# Patient Record
Sex: FEMALE | Race: WHITE | NOT HISPANIC OR LATINO | Employment: OTHER | URBAN - METROPOLITAN AREA
[De-identification: names, ages, dates, MRNs, and addresses within clinical notes are randomized per-mention and may not be internally consistent; named-entity substitution may affect disease eponyms.]

---

## 2019-02-01 NOTE — PATIENT DISCUSSION
CORNEAL ULCER, OS:  [facility]CT [facility]. PRESCRIBE VIGAMOX 1 DROP Q15 MIN FOR ONE HOUR FOLLOWED BY 1 DROP EVERY 1 HR AROUND THE CLOCK. RX EMYCIN ELAN QHS. STAY OUT OF CONTACTS. THROW AWAY OLD CL AND CASE. PT EDUCATION. RETURN FOR FOLLOW-UP AS SCHEDULED.

## 2019-02-01 NOTE — PATIENT DISCUSSION
CORNEAL ULCER:  I have talked with the patient about the significance of a corneal ulcer and the concern about long-term vision loss if not aggressively treated.

## 2019-02-01 NOTE — PATIENT DISCUSSION
Discussed with patient there may some discomfort including pain, redness, light sensitivity, tearing and blurry vision. The patient was instructed to stay out of contact lenses until directed by doctor after re-evaluation. Gave RX for Ocuflox 1 drop 4 times a day. Artificial tears may be used in between doses of Ocuflox to relieve discomfort.

## 2019-02-01 NOTE — PATIENT DISCUSSION
PT LIVES IN [place]. WILL BE IN [facility] UNTIL EARLY NEXT WEEK. SCHEDULE FOLLOW UP WITH [facility] SPECIAL[facility], DR. CHEN. PT HAS AN APPT AT 9:00 AM.   PT INSTRUCTED TO CALL OVER THE WEEKEND IF ANY WORSENING OF SYMPTOMS. PT UNDERSTANDS. DISCUSSED IMPORTANCE OF COMPLIANCE WITH SCHEDULE OF GTTS AND ELAN. DISCUSSED IMPORTANCE OF NO CL WEAR.

## 2019-08-30 NOTE — PATIENT DISCUSSION
This visual field clearly demonstrated a minimum of 32% loss of upper field of vision OU, with upper lid skin in repose and elevated by taping of the lid to demonstrate potential correction. This field shows that taping the lids significantly improved this patient's superior field of vision by approximately 31%, OU.

## 2020-01-07 ENCOUNTER — NEW REFERRAL (OUTPATIENT)
Dept: URBAN - METROPOLITAN AREA CLINIC 33 | Facility: CLINIC | Age: 85
End: 2020-01-07

## 2020-01-07 VITALS
HEIGHT: 58 IN | DIASTOLIC BLOOD PRESSURE: 80 MMHG | SYSTOLIC BLOOD PRESSURE: 130 MMHG | BODY MASS INDEX: 29.39 KG/M2 | WEIGHT: 140 LBS | HEART RATE: 78 BPM

## 2020-01-07 DIAGNOSIS — H35.61: ICD-10-CM

## 2020-01-07 DIAGNOSIS — H35.3231: ICD-10-CM

## 2020-01-07 DIAGNOSIS — H43.813: ICD-10-CM

## 2020-01-07 PROCEDURE — 92004 COMPRE OPH EXAM NEW PT 1/>: CPT

## 2020-01-07 PROCEDURE — 92250 FUNDUS PHOTOGRAPHY W/I&R: CPT

## 2020-01-07 PROCEDURE — 92134 CPTRZ OPH DX IMG PST SGM RTA: CPT

## 2020-01-07 ASSESSMENT — TONOMETRY
OS_IOP_MMHG: 14
OD_IOP_MMHG: 13

## 2020-01-07 ASSESSMENT — VISUAL ACUITY
OD_CC: 20/50-2
OD_SC: 20/50-2
OS_SC: 20/60-1
OS_CC: 20/50-1

## 2020-01-08 ENCOUNTER — CLINIC PROCEDURE ONLY (OUTPATIENT)
Dept: URBAN - METROPOLITAN AREA CLINIC 33 | Facility: CLINIC | Age: 85
End: 2020-01-08

## 2020-01-08 DIAGNOSIS — H35.3231: ICD-10-CM

## 2020-01-08 PROCEDURE — J0178* EYLEA

## 2020-01-08 PROCEDURE — 67028 INJECTION EYE DRUG: CPT

## 2020-01-08 ASSESSMENT — TONOMETRY
OS_IOP_MMHG: 15
OD_IOP_MMHG: 14

## 2020-01-08 ASSESSMENT — VISUAL ACUITY
OD_CC: 20/50+1
OS_CC: 20/50+2

## 2020-02-18 ENCOUNTER — CLINICAL PROCEDURE AND DIAGNOSTIC TESTING ONLY (OUTPATIENT)
Dept: URBAN - METROPOLITAN AREA CLINIC 33 | Facility: CLINIC | Age: 85
End: 2020-02-18

## 2020-02-18 DIAGNOSIS — H35.3231: ICD-10-CM

## 2020-02-18 PROCEDURE — 67028 INJECTION EYE DRUG: CPT

## 2020-02-18 PROCEDURE — 92134 CPTRZ OPH DX IMG PST SGM RTA: CPT

## 2020-02-18 PROCEDURE — 92250 FUNDUS PHOTOGRAPHY W/I&R: CPT

## 2020-02-18 PROCEDURE — J0178* EYLEA

## 2020-02-18 ASSESSMENT — TONOMETRY
OS_IOP_MMHG: 17
OD_IOP_MMHG: 16

## 2020-02-18 ASSESSMENT — VISUAL ACUITY
OD_CC: 20/25-2
OS_CC: 20/30-2

## 2020-03-31 ENCOUNTER — FOLLOW UP AND POST INJECTION EVALUATION (OUTPATIENT)
Dept: URBAN - METROPOLITAN AREA CLINIC 33 | Facility: CLINIC | Age: 85
End: 2020-03-31

## 2020-03-31 DIAGNOSIS — H35.3231: ICD-10-CM

## 2020-03-31 DIAGNOSIS — H35.61: ICD-10-CM

## 2020-03-31 DIAGNOSIS — H43.813: ICD-10-CM

## 2020-03-31 PROCEDURE — 92134 CPTRZ OPH DX IMG PST SGM RTA: CPT

## 2020-03-31 PROCEDURE — 67028 INJECTION EYE DRUG: CPT

## 2020-03-31 PROCEDURE — J0178* EYLEA

## 2020-03-31 PROCEDURE — 92012 INTRM OPH EXAM EST PATIENT: CPT

## 2020-03-31 PROCEDURE — 92250 FUNDUS PHOTOGRAPHY W/I&R: CPT

## 2020-03-31 ASSESSMENT — TONOMETRY
OS_IOP_MMHG: 13
OD_IOP_MMHG: 15

## 2020-03-31 ASSESSMENT — VISUAL ACUITY
OD_CC: 20/25-2
OS_CC: 20/30+1

## 2020-05-12 ENCOUNTER — CLINICAL PROCEDURE AND DIAGNOSTIC TESTING ONLY (OUTPATIENT)
Dept: URBAN - METROPOLITAN AREA CLINIC 33 | Facility: CLINIC | Age: 85
End: 2020-05-12

## 2020-05-12 DIAGNOSIS — H35.3231: ICD-10-CM

## 2020-05-12 PROCEDURE — 92250 FUNDUS PHOTOGRAPHY W/I&R: CPT

## 2020-05-12 PROCEDURE — 92134 CPTRZ OPH DX IMG PST SGM RTA: CPT

## 2020-05-12 PROCEDURE — J0178* EYLEA

## 2020-05-12 PROCEDURE — 67028 INJECTION EYE DRUG: CPT

## 2020-05-12 ASSESSMENT — TONOMETRY
OD_IOP_MMHG: 12
OS_IOP_MMHG: 13

## 2020-05-12 ASSESSMENT — VISUAL ACUITY
OD_CC: 20/30+1
OS_CC: 20/30+1

## 2020-06-23 ENCOUNTER — CLINICAL PROCEDURE AND DIAGNOSTIC TESTING ONLY (OUTPATIENT)
Dept: URBAN - METROPOLITAN AREA CLINIC 33 | Facility: CLINIC | Age: 85
End: 2020-06-23

## 2020-06-23 DIAGNOSIS — H35.3231: ICD-10-CM

## 2020-06-23 PROCEDURE — J0178* EYLEA

## 2020-06-23 PROCEDURE — 92134 CPTRZ OPH DX IMG PST SGM RTA: CPT

## 2020-06-23 PROCEDURE — 67028 INJECTION EYE DRUG: CPT

## 2020-06-23 PROCEDURE — 92250 FUNDUS PHOTOGRAPHY W/I&R: CPT

## 2020-06-23 ASSESSMENT — TONOMETRY
OD_IOP_MMHG: 16
OS_IOP_MMHG: 17

## 2020-06-23 ASSESSMENT — VISUAL ACUITY
OS_CC: 20/40+2
OD_CC: 20/30+1

## 2020-07-09 NOTE — PATIENT DISCUSSION
One week post op: great curve and shape, no infection, healing well, sutures intact and removed, D/C erythromycin to upper eyelids x 7-10 days. Use Skinuva QHS x 1 month to incisions after 10 days. Wear sunglasses and hat while outdoors. Avoid sun exposure. No restrictions in 5 days.

## 2020-08-04 ENCOUNTER — CLINICAL PROCEDURE AND DIAGNOSTIC TESTING ONLY (OUTPATIENT)
Dept: URBAN - METROPOLITAN AREA CLINIC 33 | Facility: CLINIC | Age: 85
End: 2020-08-04

## 2020-08-04 DIAGNOSIS — H35.3231: ICD-10-CM

## 2020-08-04 PROCEDURE — 92134 CPTRZ OPH DX IMG PST SGM RTA: CPT

## 2020-08-04 PROCEDURE — 67028 INJECTION EYE DRUG: CPT

## 2020-08-04 PROCEDURE — 92250 FUNDUS PHOTOGRAPHY W/I&R: CPT

## 2020-08-04 PROCEDURE — J0178* EYLEA

## 2020-08-04 ASSESSMENT — TONOMETRY
OS_IOP_MMHG: 17
OD_IOP_MMHG: 14

## 2020-08-04 ASSESSMENT — VISUAL ACUITY
OS_CC: 20/40-2
OD_CC: 20/25+1

## 2020-09-15 ENCOUNTER — CLINICAL PROCEDURE AND DIAGNOSTIC TESTING ONLY (OUTPATIENT)
Dept: URBAN - METROPOLITAN AREA CLINIC 33 | Facility: CLINIC | Age: 85
End: 2020-09-15

## 2020-09-15 DIAGNOSIS — H35.3231: ICD-10-CM

## 2020-09-15 PROCEDURE — 92134 CPTRZ OPH DX IMG PST SGM RTA: CPT

## 2020-09-15 PROCEDURE — J0178* EYLEA

## 2020-09-15 PROCEDURE — 92250 FUNDUS PHOTOGRAPHY W/I&R: CPT

## 2020-09-15 PROCEDURE — 67028 INJECTION EYE DRUG: CPT

## 2020-09-15 ASSESSMENT — VISUAL ACUITY
OS_PH: 20/50+2
OS_CC: 20/60+2
OD_CC: 20/25-2

## 2020-09-15 ASSESSMENT — TONOMETRY
OD_IOP_MMHG: 17
OS_IOP_MMHG: 16

## 2020-10-27 ENCOUNTER — FOLLOW UP AND POST INJECTION EVALUATION (OUTPATIENT)
Dept: URBAN - METROPOLITAN AREA CLINIC 33 | Facility: CLINIC | Age: 85
End: 2020-10-27

## 2020-10-27 DIAGNOSIS — H43.813: ICD-10-CM

## 2020-10-27 DIAGNOSIS — H35.3231: ICD-10-CM

## 2020-10-27 DIAGNOSIS — H35.61: ICD-10-CM

## 2020-10-27 PROCEDURE — 92250 FUNDUS PHOTOGRAPHY W/I&R: CPT

## 2020-10-27 PROCEDURE — 92134 CPTRZ OPH DX IMG PST SGM RTA: CPT

## 2020-10-27 PROCEDURE — 67028 INJECTION EYE DRUG: CPT

## 2020-10-27 PROCEDURE — 92012 INTRM OPH EXAM EST PATIENT: CPT

## 2020-10-27 PROCEDURE — J0178* EYLEA

## 2020-10-27 ASSESSMENT — TONOMETRY
OS_IOP_MMHG: 17
OD_IOP_MMHG: 16

## 2020-10-27 ASSESSMENT — VISUAL ACUITY
OD_CC: 20/20-2
OS_CC: 20/50-2

## 2020-12-08 ENCOUNTER — CLINICAL PROCEDURE AND DIAGNOSTIC TESTING ONLY (OUTPATIENT)
Dept: URBAN - METROPOLITAN AREA CLINIC 33 | Facility: CLINIC | Age: 85
End: 2020-12-08

## 2020-12-08 DIAGNOSIS — H35.3231: ICD-10-CM

## 2020-12-08 PROCEDURE — 67028 INJECTION EYE DRUG: CPT

## 2020-12-08 PROCEDURE — J0178* EYLEA

## 2020-12-08 PROCEDURE — 92250 FUNDUS PHOTOGRAPHY W/I&R: CPT

## 2020-12-08 PROCEDURE — 92134 CPTRZ OPH DX IMG PST SGM RTA: CPT

## 2020-12-08 ASSESSMENT — VISUAL ACUITY
OD_CC: 20/25+2
OS_CC: 20/40
OD_SC: 20/25+2

## 2020-12-08 ASSESSMENT — TONOMETRY
OD_IOP_MMHG: 17
OS_IOP_MMHG: 16

## 2021-01-19 ENCOUNTER — CLINICAL PROCEDURE AND DIAGNOSTIC TESTING ONLY (OUTPATIENT)
Dept: URBAN - METROPOLITAN AREA CLINIC 33 | Facility: CLINIC | Age: 86
End: 2021-01-19

## 2021-01-19 VITALS — HEIGHT: 58 IN | BODY MASS INDEX: 29.39 KG/M2 | WEIGHT: 140 LBS

## 2021-01-19 DIAGNOSIS — H35.3231: ICD-10-CM

## 2021-01-19 PROCEDURE — 92250 FUNDUS PHOTOGRAPHY W/I&R: CPT

## 2021-01-19 PROCEDURE — J0178* EYLEA

## 2021-01-19 PROCEDURE — 67028 INJECTION EYE DRUG: CPT

## 2021-03-02 ENCOUNTER — CLINICAL PROCEDURE AND DIAGNOSTIC TESTING ONLY (OUTPATIENT)
Dept: URBAN - METROPOLITAN AREA CLINIC 33 | Facility: CLINIC | Age: 86
End: 2021-03-02

## 2021-03-02 DIAGNOSIS — H35.3231: ICD-10-CM

## 2021-03-02 PROCEDURE — J0178* EYLEA

## 2021-03-02 PROCEDURE — 67028 INJECTION EYE DRUG: CPT

## 2021-03-02 PROCEDURE — 92134 CPTRZ OPH DX IMG PST SGM RTA: CPT

## 2021-03-02 PROCEDURE — 92250 FUNDUS PHOTOGRAPHY W/I&R: CPT

## 2021-04-13 ENCOUNTER — CLINICAL PROCEDURE AND DIAGNOSTIC TESTING ONLY (OUTPATIENT)
Dept: URBAN - METROPOLITAN AREA CLINIC 33 | Facility: CLINIC | Age: 86
End: 2021-04-13

## 2021-04-13 DIAGNOSIS — H35.3231: ICD-10-CM

## 2021-04-13 PROCEDURE — 67028 INJECTION EYE DRUG: CPT

## 2021-04-13 PROCEDURE — J0178* EYLEA

## 2021-04-13 PROCEDURE — 92134 CPTRZ OPH DX IMG PST SGM RTA: CPT

## 2021-04-13 PROCEDURE — 92250 FUNDUS PHOTOGRAPHY W/I&R: CPT

## 2021-05-25 ENCOUNTER — CLINICAL PROCEDURE AND DIAGNOSTIC TESTING ONLY (OUTPATIENT)
Dept: URBAN - METROPOLITAN AREA CLINIC 33 | Facility: CLINIC | Age: 86
End: 2021-05-25

## 2021-05-25 DIAGNOSIS — H35.3231: ICD-10-CM

## 2021-05-25 PROCEDURE — 67028 INJECTION EYE DRUG: CPT

## 2021-05-25 PROCEDURE — 92250 FUNDUS PHOTOGRAPHY W/I&R: CPT

## 2021-05-25 PROCEDURE — J0178* EYLEA

## 2021-07-06 ENCOUNTER — CLINICAL PROCEDURE AND DIAGNOSTIC TESTING ONLY (OUTPATIENT)
Dept: URBAN - METROPOLITAN AREA CLINIC 33 | Facility: CLINIC | Age: 86
End: 2021-07-06

## 2021-07-06 DIAGNOSIS — H35.3231: ICD-10-CM

## 2021-07-06 PROCEDURE — 92134 CPTRZ OPH DX IMG PST SGM RTA: CPT

## 2021-07-06 PROCEDURE — 67028 INJECTION EYE DRUG: CPT

## 2021-07-06 PROCEDURE — 92250 FUNDUS PHOTOGRAPHY W/I&R: CPT

## 2021-08-17 ENCOUNTER — CLINICAL PROCEDURE AND DIAGNOSTIC TESTING ONLY (OUTPATIENT)
Dept: URBAN - METROPOLITAN AREA CLINIC 33 | Facility: CLINIC | Age: 86
End: 2021-08-17

## 2021-08-17 DIAGNOSIS — H35.3231: ICD-10-CM

## 2021-08-17 PROCEDURE — J0178* EYLEA

## 2021-08-17 PROCEDURE — 92250 FUNDUS PHOTOGRAPHY W/I&R: CPT

## 2021-08-17 PROCEDURE — 67028 INJECTION EYE DRUG: CPT

## 2021-10-20 ENCOUNTER — CLINICAL PROCEDURE AND DIAGNOSTIC TESTING ONLY (OUTPATIENT)
Dept: URBAN - METROPOLITAN AREA CLINIC 33 | Facility: CLINIC | Age: 86
End: 2021-10-20

## 2021-10-20 DIAGNOSIS — H35.3221: ICD-10-CM

## 2021-10-20 DIAGNOSIS — H35.3211: ICD-10-CM

## 2021-10-20 PROCEDURE — J017850 AFLIBERCEPT (EYLEA) 1MG BILATERAL

## 2021-10-20 PROCEDURE — 67028 INJECTION EYE DRUG: CPT

## 2021-10-20 PROCEDURE — 92134 CPTRZ OPH DX IMG PST SGM RTA: CPT

## 2021-11-23 ENCOUNTER — CLINICAL PROCEDURE AND DIAGNOSTIC TESTING ONLY (OUTPATIENT)
Dept: URBAN - METROPOLITAN AREA CLINIC 33 | Facility: CLINIC | Age: 86
End: 2021-11-23

## 2021-11-23 DIAGNOSIS — H35.3211: ICD-10-CM

## 2021-11-23 DIAGNOSIS — H35.3221: ICD-10-CM

## 2021-11-23 PROCEDURE — 67028 INJECTION EYE DRUG: CPT

## 2021-11-23 PROCEDURE — 92134 CPTRZ OPH DX IMG PST SGM RTA: CPT

## 2021-11-23 PROCEDURE — J017850 AFLIBERCEPT (EYLEA) 1MG BILATERAL

## 2022-01-18 ENCOUNTER — CLINIC PROCEDURE ONLY (OUTPATIENT)
Dept: URBAN - METROPOLITAN AREA CLINIC 33 | Facility: CLINIC | Age: 87
End: 2022-01-18

## 2022-01-18 DIAGNOSIS — H35.3221: ICD-10-CM

## 2022-01-18 DIAGNOSIS — H35.3211: ICD-10-CM

## 2022-01-18 PROCEDURE — 92250 FUNDUS PHOTOGRAPHY W/I&R: CPT

## 2022-01-18 PROCEDURE — 92134 CPTRZ OPH DX IMG PST SGM RTA: CPT

## 2022-01-18 PROCEDURE — 67028 INJECTION EYE DRUG: CPT

## 2022-01-18 PROCEDURE — J017850 AFLIBERCEPT (EYLEA) 1MG BILATERAL

## 2022-03-15 ENCOUNTER — CLINIC PROCEDURE ONLY (OUTPATIENT)
Dept: URBAN - METROPOLITAN AREA CLINIC 33 | Facility: CLINIC | Age: 87
End: 2022-03-15

## 2022-03-15 DIAGNOSIS — H35.3221: ICD-10-CM

## 2022-03-15 DIAGNOSIS — H35.3211: ICD-10-CM

## 2022-03-15 PROCEDURE — J017850 AFLIBERCEPT (EYLEA) 1MG BILATERAL

## 2022-03-15 PROCEDURE — 6702850 BILATERAL INTRAVITREAL INJECTION

## 2022-03-15 PROCEDURE — 92134 CPTRZ OPH DX IMG PST SGM RTA: CPT

## 2022-03-15 PROCEDURE — 92250 FUNDUS PHOTOGRAPHY W/I&R: CPT

## 2022-05-03 ENCOUNTER — COMPREHENSIVE EXAM (OUTPATIENT)
Dept: URBAN - METROPOLITAN AREA CLINIC 33 | Facility: CLINIC | Age: 87
End: 2022-05-03

## 2022-05-03 DIAGNOSIS — H35.3231: ICD-10-CM

## 2022-05-03 PROCEDURE — J017850 AFLIBERCEPT (EYLEA) 1MG BILATERAL

## 2022-05-03 PROCEDURE — 92134 CPTRZ OPH DX IMG PST SGM RTA: CPT

## 2022-05-03 PROCEDURE — 6702850 BILATERAL INTRAVITREAL INJECTION

## 2022-05-03 ASSESSMENT — VISUAL ACUITY
OS_CC: 20/80-2
OD_CC: 20/30
OD_PH: 20/25+2

## 2022-05-03 ASSESSMENT — TONOMETRY
OS_IOP_MMHG: 15
OD_IOP_MMHG: 16

## 2022-06-28 ENCOUNTER — CLINIC PROCEDURE ONLY (OUTPATIENT)
Dept: URBAN - METROPOLITAN AREA CLINIC 33 | Facility: CLINIC | Age: 87
End: 2022-06-28

## 2022-06-28 DIAGNOSIS — H35.3231: ICD-10-CM

## 2022-06-28 PROCEDURE — 6702850 BILATERAL INTRAVITREAL INJECTION

## 2022-06-28 PROCEDURE — J017850 AFLIBERCEPT (EYLEA) 1MG BILATERAL

## 2022-06-28 PROCEDURE — 92250 FUNDUS PHOTOGRAPHY W/I&R: CPT

## 2022-06-28 PROCEDURE — 92134 CPTRZ OPH DX IMG PST SGM RTA: CPT

## 2022-08-23 ENCOUNTER — CLINIC PROCEDURE ONLY (OUTPATIENT)
Dept: URBAN - METROPOLITAN AREA CLINIC 33 | Facility: CLINIC | Age: 87
End: 2022-08-23

## 2022-08-23 DIAGNOSIS — H35.3231: ICD-10-CM

## 2022-08-23 PROCEDURE — J017850 AFLIBERCEPT (EYLEA) 1MG BILATERAL

## 2022-08-23 PROCEDURE — 92250 FUNDUS PHOTOGRAPHY W/I&R: CPT

## 2022-08-23 PROCEDURE — 6702850 BILATERAL INTRAVITREAL INJECTION

## 2022-08-23 PROCEDURE — 92134 CPTRZ OPH DX IMG PST SGM RTA: CPT

## 2022-10-18 ENCOUNTER — FOLLOW UP (OUTPATIENT)
Dept: URBAN - METROPOLITAN AREA CLINIC 33 | Facility: CLINIC | Age: 87
End: 2022-10-18

## 2022-10-18 DIAGNOSIS — D31.32: ICD-10-CM

## 2022-10-18 DIAGNOSIS — H35.3231: ICD-10-CM

## 2022-10-18 DIAGNOSIS — H43.813: ICD-10-CM

## 2022-10-18 DIAGNOSIS — H35.61: ICD-10-CM

## 2022-10-18 PROCEDURE — 92014 COMPRE OPH EXAM EST PT 1/>: CPT

## 2022-10-18 PROCEDURE — J017850 AFLIBERCEPT (EYLEA) 1MG BILATERAL

## 2022-10-18 PROCEDURE — 92134 CPTRZ OPH DX IMG PST SGM RTA: CPT

## 2022-10-18 PROCEDURE — 6702850 BILATERAL INTRAVITREAL INJECTION

## 2022-10-18 PROCEDURE — 92250 FUNDUS PHOTOGRAPHY W/I&R: CPT

## 2022-10-18 ASSESSMENT — TONOMETRY
OS_IOP_MMHG: 15
OD_IOP_MMHG: 14

## 2022-10-18 ASSESSMENT — VISUAL ACUITY
OS_PH: 20/60-2
OS_CC: 20/70-2
OD_CC: 20/30-1

## 2022-12-20 ENCOUNTER — CLINIC PROCEDURE ONLY (OUTPATIENT)
Dept: URBAN - METROPOLITAN AREA CLINIC 33 | Facility: CLINIC | Age: 87
End: 2022-12-20

## 2022-12-20 PROCEDURE — 67028 INJECTION EYE DRUG: CPT

## 2022-12-20 PROCEDURE — 92134 CPTRZ OPH DX IMG PST SGM RTA: CPT

## 2023-04-11 ENCOUNTER — CLINIC PROCEDURE ONLY (OUTPATIENT)
Dept: URBAN - METROPOLITAN AREA CLINIC 33 | Facility: CLINIC | Age: 88
End: 2023-04-11

## 2023-04-11 DIAGNOSIS — H35.3231: ICD-10-CM

## 2023-04-11 PROCEDURE — 92134 CPTRZ OPH DX IMG PST SGM RTA: CPT

## 2023-04-11 PROCEDURE — 92250 FUNDUS PHOTOGRAPHY W/I&R: CPT

## 2023-04-11 PROCEDURE — 67028 INJECTION EYE DRUG: CPT

## 2023-06-06 ENCOUNTER — CLINIC PROCEDURE ONLY (OUTPATIENT)
Dept: URBAN - METROPOLITAN AREA CLINIC 33 | Facility: CLINIC | Age: 88
End: 2023-06-06

## 2023-06-06 DIAGNOSIS — H35.3231: ICD-10-CM

## 2023-06-06 PROCEDURE — 92134 CPTRZ OPH DX IMG PST SGM RTA: CPT

## 2023-06-06 PROCEDURE — 67028 INJECTION EYE DRUG: CPT

## 2023-06-06 PROCEDURE — 92250 FUNDUS PHOTOGRAPHY W/I&R: CPT

## 2023-07-18 ENCOUNTER — CLINIC PROCEDURE ONLY (OUTPATIENT)
Dept: URBAN - METROPOLITAN AREA CLINIC 33 | Facility: CLINIC | Age: 88
End: 2023-07-18

## 2023-07-18 DIAGNOSIS — H35.3231: ICD-10-CM

## 2023-07-18 PROCEDURE — 92250 FUNDUS PHOTOGRAPHY W/I&R: CPT

## 2023-07-18 PROCEDURE — 92134 CPTRZ OPH DX IMG PST SGM RTA: CPT

## 2023-07-18 PROCEDURE — 67028 INJECTION EYE DRUG: CPT

## 2023-08-29 ENCOUNTER — CLINIC PROCEDURE ONLY (OUTPATIENT)
Dept: URBAN - METROPOLITAN AREA CLINIC 33 | Facility: CLINIC | Age: 88
End: 2023-08-29

## 2023-08-29 DIAGNOSIS — H35.3231: ICD-10-CM

## 2023-08-29 PROCEDURE — 92134 CPTRZ OPH DX IMG PST SGM RTA: CPT

## 2023-08-29 PROCEDURE — 67028 INJECTION EYE DRUG: CPT

## 2023-08-29 PROCEDURE — 92250 FUNDUS PHOTOGRAPHY W/I&R: CPT

## 2023-11-07 ENCOUNTER — CLINIC PROCEDURE ONLY (OUTPATIENT)
Dept: URBAN - METROPOLITAN AREA CLINIC 33 | Facility: CLINIC | Age: 88
End: 2023-11-07

## 2023-11-07 DIAGNOSIS — H35.3231: ICD-10-CM

## 2023-11-07 PROCEDURE — 67028 INJECTION EYE DRUG: CPT

## 2023-11-07 PROCEDURE — 92250 FUNDUS PHOTOGRAPHY W/I&R: CPT

## 2023-11-07 PROCEDURE — 92134 CPTRZ OPH DX IMG PST SGM RTA: CPT

## 2024-01-16 ENCOUNTER — FOLLOW UP (OUTPATIENT)
Dept: URBAN - METROPOLITAN AREA CLINIC 33 | Facility: CLINIC | Age: 89
End: 2024-01-16

## 2024-01-16 VITALS — BODY MASS INDEX: 29.39 KG/M2 | WEIGHT: 140 LBS | HEIGHT: 58 IN

## 2024-01-16 DIAGNOSIS — H35.3133: ICD-10-CM

## 2024-01-16 DIAGNOSIS — H02.834: ICD-10-CM

## 2024-01-16 DIAGNOSIS — H02.831: ICD-10-CM

## 2024-01-16 DIAGNOSIS — H35.61: ICD-10-CM

## 2024-01-16 DIAGNOSIS — H43.813: ICD-10-CM

## 2024-01-16 DIAGNOSIS — H04.123: ICD-10-CM

## 2024-01-16 DIAGNOSIS — H35.3231: ICD-10-CM

## 2024-01-16 DIAGNOSIS — D31.32: ICD-10-CM

## 2024-01-16 PROCEDURE — 92134 CPTRZ OPH DX IMG PST SGM RTA: CPT

## 2024-01-16 PROCEDURE — 92014 COMPRE OPH EXAM EST PT 1/>: CPT

## 2024-01-16 PROCEDURE — 67028 INJECTION EYE DRUG: CPT

## 2024-01-16 PROCEDURE — 92250 FUNDUS PHOTOGRAPHY W/I&R: CPT

## 2024-01-16 ASSESSMENT — TONOMETRY
OD_IOP_MMHG: 15
OS_IOP_MMHG: 16

## 2024-01-16 ASSESSMENT — VISUAL ACUITY
OS_CC: 20/80+2
OD_CC: 20/30+2

## 2024-03-19 ENCOUNTER — CLINIC PROCEDURE ONLY (OUTPATIENT)
Dept: URBAN - METROPOLITAN AREA CLINIC 33 | Facility: CLINIC | Age: 89
End: 2024-03-19

## 2024-03-19 DIAGNOSIS — H35.3231: ICD-10-CM

## 2024-03-19 PROCEDURE — 92250 FUNDUS PHOTOGRAPHY W/I&R: CPT

## 2024-03-19 PROCEDURE — 67028 INJECTION EYE DRUG: CPT

## 2024-03-19 PROCEDURE — 92134 CPTRZ OPH DX IMG PST SGM RTA: CPT

## 2024-05-30 ENCOUNTER — CLINIC PROCEDURE ONLY (OUTPATIENT)
Dept: URBAN - METROPOLITAN AREA CLINIC 33 | Facility: CLINIC | Age: 89
End: 2024-05-30

## 2024-05-30 DIAGNOSIS — H35.3231: ICD-10-CM

## 2024-05-30 PROCEDURE — 67028 INJECTION EYE DRUG: CPT

## 2024-05-30 PROCEDURE — 92134 CPTRZ OPH DX IMG PST SGM RTA: CPT

## 2024-05-30 PROCEDURE — 92250 FUNDUS PHOTOGRAPHY W/I&R: CPT | Mod: 59

## 2024-05-30 ASSESSMENT — TONOMETRY
OS_IOP_MMHG: 15
OD_IOP_MMHG: 19

## 2024-08-15 ENCOUNTER — COMPREHENSIVE EXAM (OUTPATIENT)
Dept: URBAN - METROPOLITAN AREA CLINIC 33 | Facility: CLINIC | Age: 89
End: 2024-08-15

## 2024-08-15 DIAGNOSIS — H02.831: ICD-10-CM

## 2024-08-15 DIAGNOSIS — H04.123: ICD-10-CM

## 2024-08-15 DIAGNOSIS — H02.834: ICD-10-CM

## 2024-08-15 DIAGNOSIS — H35.3133: ICD-10-CM

## 2024-08-15 DIAGNOSIS — H43.813: ICD-10-CM

## 2024-08-15 DIAGNOSIS — H35.3231: ICD-10-CM

## 2024-08-15 DIAGNOSIS — D31.32: ICD-10-CM

## 2024-08-15 DIAGNOSIS — H35.61: ICD-10-CM

## 2024-08-15 PROCEDURE — 67028 INJECTION EYE DRUG: CPT

## 2024-08-15 PROCEDURE — 92014 COMPRE OPH EXAM EST PT 1/>: CPT | Mod: 25

## 2024-08-15 PROCEDURE — 92134 CPTRZ OPH DX IMG PST SGM RTA: CPT

## 2024-08-15 PROCEDURE — 92250 FUNDUS PHOTOGRAPHY W/I&R: CPT | Mod: 59

## 2024-08-15 ASSESSMENT — VISUAL ACUITY
OD_CC: 20/30-1
OS_CC: 20/100-2

## 2024-08-15 ASSESSMENT — TONOMETRY
OS_IOP_MMHG: 15
OD_IOP_MMHG: 15

## 2024-10-17 ENCOUNTER — CLINIC PROCEDURE ONLY (OUTPATIENT)
Dept: URBAN - METROPOLITAN AREA CLINIC 33 | Facility: CLINIC | Age: 89
End: 2024-10-17

## 2024-10-17 DIAGNOSIS — H35.3231: ICD-10-CM

## 2024-10-17 PROCEDURE — 67028 INJECTION EYE DRUG: CPT

## 2024-10-31 ENCOUNTER — CLINIC PROCEDURE ONLY (OUTPATIENT)
Dept: URBAN - METROPOLITAN AREA CLINIC 33 | Facility: CLINIC | Age: 89
End: 2024-10-31

## 2024-10-31 DIAGNOSIS — H35.3231: ICD-10-CM

## 2024-10-31 PROCEDURE — 92134 CPTRZ OPH DX IMG PST SGM RTA: CPT

## 2024-10-31 PROCEDURE — 67028 INJECTION EYE DRUG: CPT

## 2024-12-12 ENCOUNTER — CLINIC PROCEDURE ONLY (OUTPATIENT)
Age: 89
End: 2024-12-12

## 2024-12-12 DIAGNOSIS — H35.3231: ICD-10-CM

## 2024-12-12 PROCEDURE — 92134 CPTRZ OPH DX IMG PST SGM RTA: CPT

## 2024-12-12 PROCEDURE — 67028 INJECTION EYE DRUG: CPT

## 2025-02-06 ENCOUNTER — CLINIC PROCEDURE ONLY (OUTPATIENT)
Age: OVER 89
End: 2025-02-06

## 2025-02-06 DIAGNOSIS — H35.3231: ICD-10-CM

## 2025-02-06 PROCEDURE — 67028 INJECTION EYE DRUG: CPT

## 2025-02-06 PROCEDURE — 92134 CPTRZ OPH DX IMG PST SGM RTA: CPT

## 2025-02-06 PROCEDURE — 92250 FUNDUS PHOTOGRAPHY W/I&R: CPT | Mod: 59

## 2025-04-24 ENCOUNTER — CLINIC PROCEDURE ONLY (OUTPATIENT)
Age: OVER 89
End: 2025-04-24

## 2025-04-24 DIAGNOSIS — H35.3231: ICD-10-CM

## 2025-04-24 PROCEDURE — 92134 CPTRZ OPH DX IMG PST SGM RTA: CPT

## 2025-04-24 PROCEDURE — 92250 FUNDUS PHOTOGRAPHY W/I&R: CPT | Mod: 59

## 2025-04-24 PROCEDURE — 67028 INJECTION EYE DRUG: CPT

## 2025-07-03 ENCOUNTER — CLINIC PROCEDURE ONLY (OUTPATIENT)
Age: OVER 89
End: 2025-07-03

## 2025-07-03 DIAGNOSIS — H35.3231: ICD-10-CM

## 2025-07-03 PROCEDURE — 67028 INJECTION EYE DRUG: CPT

## 2025-07-03 PROCEDURE — 92134 CPTRZ OPH DX IMG PST SGM RTA: CPT

## 2025-07-03 PROCEDURE — 92250 FUNDUS PHOTOGRAPHY W/I&R: CPT | Mod: 59

## 2025-07-29 ENCOUNTER — NEW PATIENT (OUTPATIENT)
Age: OVER 89
End: 2025-07-29

## 2025-07-29 DIAGNOSIS — H52.4: ICD-10-CM

## 2025-07-29 PROCEDURE — 92015 DETERMINE REFRACTIVE STATE: CPT
